# Patient Record
Sex: FEMALE
[De-identification: names, ages, dates, MRNs, and addresses within clinical notes are randomized per-mention and may not be internally consistent; named-entity substitution may affect disease eponyms.]

---

## 2022-01-13 ENCOUNTER — NURSE TRIAGE (OUTPATIENT)
Dept: OTHER | Facility: CLINIC | Age: 27
End: 2022-01-13

## 2022-01-13 NOTE — TELEPHONE ENCOUNTER
Subjective: Caller states \"I had COVID and I need a letter to be able to go back to work. \"     Current Symptoms: No need for triage, looking for provider to give her letter for clearance to return to work after having Micah. Verified with customer service that patient can use any virtual care option, and Brandy in customer service suggested [facility] as being in network for her. Advised patient to try this or another virtual care option. Care advice provided, patient verbalizes understanding; denies any other questions or concerns; instructed to call back for any new or worsening symptoms. This triage is a result of a call to [address] [place] [address]. Please do not respond to the triage nurse through this encounter. Any subsequent communication should be directly with the patient.         Reason for Disposition   General information question, no triage required and triager able to answer question    Protocols used: INFORMATION ONLY CALL - NO TRIAGE-[facility]-

## 2024-07-08 ENCOUNTER — APPOINTMENT (OUTPATIENT)
Dept: OBSTETRICS AND GYNECOLOGY | Facility: CLINIC | Age: 29
End: 2024-07-08

## 2024-07-08 VITALS
SYSTOLIC BLOOD PRESSURE: 110 MMHG | DIASTOLIC BLOOD PRESSURE: 70 MMHG | WEIGHT: 176.37 LBS | BODY MASS INDEX: 30.27 KG/M2

## 2024-07-08 DIAGNOSIS — Z30.432 ENCOUNTER FOR IUD REMOVAL: Primary | ICD-10-CM

## 2024-07-08 PROCEDURE — 58301 REMOVE INTRAUTERINE DEVICE: CPT | Performed by: NURSE PRACTITIONER

## 2024-07-08 PROCEDURE — 1036F TOBACCO NON-USER: CPT | Performed by: NURSE PRACTITIONER

## 2024-07-08 RX ORDER — APREMILAST 30 MG/1
30 TABLET, FILM COATED ORAL 2 TIMES DAILY
COMMUNITY

## 2024-07-08 RX ORDER — LEVONORGESTREL 19.5 MG/1
1 INTRAUTERINE DEVICE INTRAUTERINE ONCE
COMMUNITY

## 2024-07-08 NOTE — LETTER
July 8, 2024     Patient: Sunita Hernandez   YOB: 1995   Date of Visit: 7/8/2024       To Whom It May Concern:    Sunita Hernandez was seen in my clinic on 7/8/2024 at 10:20 am. Please excuse Sunita for her absence from work on this day to make the appointment and subsequent procedure.     If you have any questions or concerns, please don't hesitate to call.         Sincerely,         Deirdre Vidales, APRN-CNP        CC: No Recipients

## 2024-07-08 NOTE — PROGRESS NOTES
Subjective   Patient ID: Sunita Hernandez is a 29 y.o. female who presents for removal of an intrauterine device (Reviewing  EMR/Last Annual Exam:  09/23/2021/Negative Pap 2019/Kyleena inserted 09/23/2021/- patient declined a chaperone-/).  HPI  Here for IUD removal. Consent signed. She does not want any form of birth control at this time. She wants to see what will happen with her periods. She has a hx of PCOS. She plans to use condoms for pregnancy prevention.   She is without insurance at this time as she just started a new job and has a gap in coverage. She will schedule her annual in January when her insurance coverage will start. She will be due for a PAP and would like STD testing at that time as well.     Review of Systems   All other systems reviewed and are negative.      Objective   Physical Exam  Constitutional:       Appearance: Normal appearance.   Pulmonary:      Effort: Pulmonary effort is normal.      Breath sounds: Normal breath sounds.   Genitourinary:     General: Normal vulva.      Vagina: Normal.      Cervix: Normal.      Uterus: Normal.       Comments: + IUD strings noted on exam.   Neurological:      Mental Status: She is alert.   Psychiatric:         Mood and Affect: Mood normal.         Behavior: Behavior normal.     Patient ID: Sunita Hernandez is a 29 y.o. female.    IUD Removal    Date/Time: 7/8/2024 10:48 AM    Performed by: HERB Talbert  Authorized by: HERB Talbert    Consent:     Consent obtained:  Written    Consent given by:  Patient    Procedure risks and benefits discussed: yes      Patient questions answered: yes      Patient agrees, verbalizes understanding, and wants to proceed: yes      Educational handouts given: yes      Instructions and paperwork completed: yes    Universal protocol:     Patient states understanding of procedure being performed: yes      Relevant documents present and verified: yes      Test results available and properly labeled: yes       Required blood products, implants, devices, and special equipment available: yes    Procedure:     Removed with no complications: yes      Other reason for removal:  Pt desires removal      Assessment/Plan   Diagnoses and all orders for this visit:  Encounter for IUD removal  Other orders  -     IUD Removal  IUD removed per procedure note. Pt tolerated well. She will monitor her periods moving forward. She will follow-up in January to re-evaluate cycles, and for well woman exam, PAP and STD testing. She will return to the office sooner with any concerns.        HERB Talbert 07/08/24 10:39 AM

## 2024-10-07 ENCOUNTER — APPOINTMENT (OUTPATIENT)
Dept: OBSTETRICS AND GYNECOLOGY | Facility: CLINIC | Age: 29
End: 2024-10-07

## 2025-01-16 ENCOUNTER — APPOINTMENT (OUTPATIENT)
Dept: OBSTETRICS AND GYNECOLOGY | Facility: CLINIC | Age: 30
End: 2025-01-16

## 2025-01-16 ENCOUNTER — LAB (OUTPATIENT)
Dept: LAB | Facility: LAB | Age: 30
End: 2025-01-16
Payer: COMMERCIAL

## 2025-01-16 VITALS
WEIGHT: 182 LBS | HEIGHT: 66 IN | BODY MASS INDEX: 29.25 KG/M2 | SYSTOLIC BLOOD PRESSURE: 110 MMHG | DIASTOLIC BLOOD PRESSURE: 72 MMHG

## 2025-01-16 DIAGNOSIS — Z12.4 SCREENING FOR CERVICAL CANCER: ICD-10-CM

## 2025-01-16 DIAGNOSIS — N92.6 IRREGULAR PERIODS: ICD-10-CM

## 2025-01-16 DIAGNOSIS — Z11.3 SCREENING FOR STD (SEXUALLY TRANSMITTED DISEASE): ICD-10-CM

## 2025-01-16 DIAGNOSIS — Z11.51 SCREENING FOR HUMAN PAPILLOMAVIRUS (HPV): ICD-10-CM

## 2025-01-16 DIAGNOSIS — Z01.419 ENCOUNTER FOR WELL WOMAN EXAM WITH ROUTINE GYNECOLOGICAL EXAM: Primary | ICD-10-CM

## 2025-01-16 LAB
CHOLEST SERPL-MCNC: 135 MG/DL (ref 0–199)
CHOLESTEROL/HDL RATIO: 2.5
ERYTHROCYTE [DISTWIDTH] IN BLOOD BY AUTOMATED COUNT: 12.2 % (ref 11.5–14.5)
HCT VFR BLD AUTO: 43 % (ref 36–46)
HDLC SERPL-MCNC: 53.8 MG/DL
HGB BLD-MCNC: 14.8 G/DL (ref 12–16)
INSULIN P FAST SERPL-ACNC: 6 UIU/ML (ref 3–25)
LDLC SERPL CALC-MCNC: 73 MG/DL
MCH RBC QN AUTO: 31.4 PG (ref 26–34)
MCHC RBC AUTO-ENTMCNC: 34.4 G/DL (ref 32–36)
MCV RBC AUTO: 91 FL (ref 80–100)
NON HDL CHOLESTEROL: 81 MG/DL (ref 0–149)
NRBC BLD-RTO: 0 /100 WBCS (ref 0–0)
PLATELET # BLD AUTO: 298 X10*3/UL (ref 150–450)
RBC # BLD AUTO: 4.71 X10*6/UL (ref 4–5.2)
TESTOST SERPL-MCNC: 43 NG/DL (ref 0–70)
TRIGL SERPL-MCNC: 39 MG/DL (ref 0–149)
TSH SERPL-ACNC: 1.39 MIU/L (ref 0.44–3.98)
VLDL: 8 MG/DL (ref 0–40)
WBC # BLD AUTO: 6.2 X10*3/UL (ref 4.4–11.3)

## 2025-01-16 PROCEDURE — 80061 LIPID PANEL: CPT

## 2025-01-16 PROCEDURE — 83525 ASSAY OF INSULIN: CPT

## 2025-01-16 PROCEDURE — 3008F BODY MASS INDEX DOCD: CPT | Performed by: NURSE PRACTITIONER

## 2025-01-16 PROCEDURE — 87591 N.GONORRHOEAE DNA AMP PROB: CPT

## 2025-01-16 PROCEDURE — 1036F TOBACCO NON-USER: CPT | Performed by: NURSE PRACTITIONER

## 2025-01-16 PROCEDURE — 85027 COMPLETE CBC AUTOMATED: CPT

## 2025-01-16 PROCEDURE — 84443 ASSAY THYROID STIM HORMONE: CPT

## 2025-01-16 PROCEDURE — 99395 PREV VISIT EST AGE 18-39: CPT | Performed by: NURSE PRACTITIONER

## 2025-01-16 PROCEDURE — 84403 ASSAY OF TOTAL TESTOSTERONE: CPT

## 2025-01-16 PROCEDURE — 87491 CHLMYD TRACH DNA AMP PROBE: CPT

## 2025-01-16 NOTE — PROGRESS NOTES
"     HPI:   Sunita Hernandez is a 29 y.o. who presents today for her annual gynecologic exam with complaints    She has the following concerns;   IUD was removed in July. Since having it removed, she is having regular periods. Periods are monthly. Last cycle was 40 days, bleeding lasted 4 days. Bleeding is light to moderate. Last 4-6 days on average. Changing tampons three times per day. Hx of PCOS. She has gained weight since she stopped her birth control. Noticing more facial hair growth. She is concerned that she may not be able to get pregnant.     GYN HISTORY:  Periods are  32-40 days , lasting 5 days.   Dysmenorrhea:mild, occurring first 1-2 days of flow. Cyclic symptoms include none.   No intermenstrual bleeding, spotting, or discharge.    Current contraception: none      Requests STD testing: yes -        PAP History   Last pap:   2019 Normal HPV Not done  History of abnormal pap: no  HPV vaccine: no  @paphx@    Health Screening  Family history of breast, uterine, ovarian or colon cancer: no         The patient feels safe at home.         Review of Systems:   Constitutional: no fever and no chills.  Cardiovascular: no chest pain.   Respiratory: no shortness of breath.   Gastrointestinal: no nausea, no abdominal pain and no constipation  Genitourinary: no dysuria, no urinary incontinence, no vaginal dryness, no pelvic pain and no vaginal discharge.   Neurological: no headache.  Psychiatric: no anxiety and no depression.              Objective         /72   Ht 1.665 m (5' 5.55\")   Wt 82.6 kg (182 lb)   LMP 12/26/2024   BMI 29.78 kg/m²         Physical Exam:   Constitutional: Alert and in no acute distress. Well developed, well nourished.      Neck: No neck asymmetry. Supple. Thyroid not enlarged and there were no palpable thyroid nodules.      Cardiovascular: Heart rate and rhythm were normal, normal S1 and S2, no gallops, and no murmurs.      Pulmonary: No respiratory distress. Clear bilateral breath " sounds.      Chest: Breasts: Normal appearance, no nipple discharge and no skin changes. Palpation of breasts and axillae: No palpable mass and no axillary lymphadenopathy.      Abdomen: Soft nontender; no abdominal mass palpated. Normal bowel sounds. No organomegaly.      Genitourinary:   - External genitalia: Normal.   - Palpation of lymph nodes in groin: No inguinal lymphadenopathy.   - Bartholin's Urethral and Skenes Glands: Normal.   - Urethra: Normal.    -Bladder: Normal on palpation.   - Vagina: Normal.   - Cervix: Normal.   - Uterus: Normal. Right Adnexa/parametria: Normal. Left Adnexa/parametria: Normal.   - Perianal Area: Normal.      Skin: Normal skin color and pigmentation, normal skin turgor, and no rash     Psychiatric: Alert and oriented x 3. Affect normal to patient baseline. Mood: Appropriate.            Assessment/Plan       Diagnoses and all orders for this visit:  Encounter for well woman exam with routine gynecological exam  Here for well woman exam. She has been having periods since having her IUD removed. She has a hx of PCOS. We discussed diet and exercise as mainstay treatment of PCOS. Reviewed daily protein/ fiber goals and movement goals (150 minutes moderate intensity per week).  Will order labs. She may consider use of cyclical progesterone use if periods continue to space out. PAP and STD testing obtained.   Irregular periods  -     TSH with reflex to Free T4 if abnormal; Future  -     Insulin, Fasting; Future  -     Testosterone; Future  -     Lipid Panel; Future  -     CBC; Future  Screening for STD (sexually transmitted disease)  -     THINPREP PAP TEST (25-30)  Screening for cervical cancer  -     THINPREP PAP TEST (25-30)  Screening for human papillomavirus (HPV)  -     THINPREP PAP TEST (25-30)  Follow-up annually; sooner if needed, or pending results.       Deirdre Vidales, APRN-CNP .ANNUALPM

## 2025-01-17 LAB
C TRACH RRNA SPEC QL NAA+PROBE: NEGATIVE
N GONORRHOEA DNA SPEC QL PROBE+SIG AMP: NEGATIVE

## 2025-02-03 LAB
CYTOLOGY CMNT CVX/VAG CYTO-IMP: NORMAL
LAB AP HPV GENOTYPE QUESTION: YES
LAB AP HPV HR: NORMAL
LAB AP PAP ADDITIONAL TESTS: NORMAL
LABORATORY COMMENT REPORT: NORMAL
LMP START DATE: NORMAL
PATH REPORT.TOTAL CANCER: NORMAL

## 2025-02-06 DIAGNOSIS — B96.89 BV (BACTERIAL VAGINOSIS): Primary | ICD-10-CM

## 2025-02-06 DIAGNOSIS — N76.0 BV (BACTERIAL VAGINOSIS): Primary | ICD-10-CM

## 2025-02-06 RX ORDER — METRONIDAZOLE 500 MG/1
500 TABLET ORAL 2 TIMES DAILY
Qty: 14 TABLET | Refills: 0 | Status: SHIPPED | OUTPATIENT
Start: 2025-02-06 | End: 2025-02-13

## 2025-02-07 DIAGNOSIS — N89.8 VAGINAL DISCHARGE: Primary | ICD-10-CM

## 2025-02-07 RX ORDER — FLUCONAZOLE 150 MG/1
150 TABLET ORAL ONCE
Qty: 2 TABLET | Refills: 0 | Status: SHIPPED | OUTPATIENT
Start: 2025-02-07 | End: 2025-02-07

## 2025-02-07 NOTE — TELEPHONE ENCOUNTER
Pt told Flagyl was called in due to BV on Pap. Patient states she often times gets yeast infections with antibiotic and is asking for Diflucan

## 2026-01-23 ENCOUNTER — APPOINTMENT (OUTPATIENT)
Dept: OBSTETRICS AND GYNECOLOGY | Facility: CLINIC | Age: 31
End: 2026-01-23
Payer: COMMERCIAL